# Patient Record
Sex: MALE | Race: BLACK OR AFRICAN AMERICAN | Employment: UNEMPLOYED | URBAN - METROPOLITAN AREA
[De-identification: names, ages, dates, MRNs, and addresses within clinical notes are randomized per-mention and may not be internally consistent; named-entity substitution may affect disease eponyms.]

---

## 2018-07-09 ENCOUNTER — HOSPITAL ENCOUNTER (EMERGENCY)
Age: 28
Discharge: HOME | End: 2018-07-09
Attending: EMERGENCY MEDICINE
Payer: SELF-PAY

## 2018-07-09 VITALS
RESPIRATION RATE: 16 BRPM | HEIGHT: 66 IN | BODY MASS INDEX: 27.04 KG/M2 | WEIGHT: 168.25 LBS | TEMPERATURE: 97.7 F | DIASTOLIC BLOOD PRESSURE: 68 MMHG | SYSTOLIC BLOOD PRESSURE: 116 MMHG | HEART RATE: 69 BPM | OXYGEN SATURATION: 100 %

## 2018-07-09 DIAGNOSIS — N48.5 PENILE ULCER: Primary | ICD-10-CM

## 2018-07-09 LAB — RPR SER QL: NONREACTIVE

## 2018-07-09 PROCEDURE — 74011000250 HC RX REV CODE- 250: Performed by: EMERGENCY MEDICINE

## 2018-07-09 PROCEDURE — 74011250636 HC RX REV CODE- 250/636: Performed by: EMERGENCY MEDICINE

## 2018-07-09 PROCEDURE — 96372 THER/PROPH/DIAG INJ SC/IM: CPT

## 2018-07-09 PROCEDURE — 74011250637 HC RX REV CODE- 250/637: Performed by: EMERGENCY MEDICINE

## 2018-07-09 PROCEDURE — 87491 CHLMYD TRACH DNA AMP PROBE: CPT | Performed by: EMERGENCY MEDICINE

## 2018-07-09 PROCEDURE — 99283 EMERGENCY DEPT VISIT LOW MDM: CPT

## 2018-07-09 PROCEDURE — 86592 SYPHILIS TEST NON-TREP QUAL: CPT | Performed by: EMERGENCY MEDICINE

## 2018-07-09 RX ORDER — AZITHROMYCIN 500 MG/1
1000 TABLET, FILM COATED ORAL
Status: COMPLETED | OUTPATIENT
Start: 2018-07-09 | End: 2018-07-09

## 2018-07-09 RX ORDER — ACYCLOVIR 800 MG/1
800 TABLET ORAL
Qty: 35 TAB | Refills: 0 | Status: SHIPPED | OUTPATIENT
Start: 2018-07-09 | End: 2018-07-16

## 2018-07-09 RX ADMIN — PENICILLIN G BENZATHINE 2.4 MILLION UNITS: 1200000 INJECTION, SUSPENSION INTRAMUSCULAR at 07:17

## 2018-07-09 RX ADMIN — LIDOCAINE HYDROCHLORIDE 250 MG: 10 INJECTION, SOLUTION EPIDURAL; INFILTRATION; INTRACAUDAL; PERINEURAL at 07:17

## 2018-07-09 RX ADMIN — AZITHROMYCIN 1000 MG: 500 TABLET, FILM COATED ORAL at 07:17

## 2018-07-09 NOTE — ED NOTES
Pt medicated as per provider orders. Pt accepted DC data and med's. Pt left unit steady gait. Patient (s)  given copy of dc instructions and 1 script(s). Patient (s)  verbalized understanding of instructions and script (s). Patient given a current medication reconciliation form and verbalized understanding of their medications. Patient (s) verbalized understanding of the importance of discussing medications with  his or her physician or clinic they will be following up with. Patient alert and oriented and in no acute distress. Patient discharged home ambulatory with self.

## 2018-07-09 NOTE — ED PROVIDER NOTES
HPI Comments: 26yo previously healthy male presents with several weeks of painless penile ulcers on the shaft of his penis. Denies drainage, pain, fevers, dysuria, penile discharge, abdominal pain. No h/o same. Patient is a 33 y.o. male presenting with skin problem. The history is provided by the patient. Skin Problem This is a new problem. The current episode started more than 1 week ago. The problem has not changed since onset. There has been no fever. The rash is present on the genitalia. The patient is experiencing no pain. Pertinent negatives include no itching, no pain, no weeping and no hives. He has tried nothing for the symptoms. No past medical history on file. No past surgical history on file. No family history on file. Social History Social History  Marital status:  Spouse name: N/A  
 Number of children: N/A  
 Years of education: N/A Occupational History  Not on file. Social History Main Topics  Smoking status: Not on file  Smokeless tobacco: Not on file  Alcohol use Not on file  Drug use: Not on file  Sexual activity: Not on file Other Topics Concern  Not on file Social History Narrative ALLERGIES: Review of patient's allergies indicates no known allergies. Review of Systems Constitutional: Negative. Negative for fever. HENT: Negative. Negative for drooling, facial swelling and trouble swallowing. Eyes: Negative. Negative for discharge and redness. Respiratory: Negative. Negative for chest tightness, shortness of breath and wheezing. Cardiovascular: Negative. Negative for chest pain. Gastrointestinal: Negative. Negative for abdominal distention, abdominal pain, constipation, diarrhea, nausea and vomiting. Endocrine: Negative. Genitourinary: Positive for genital sores. Negative for difficulty urinating, discharge, dysuria, penile pain, penile swelling, scrotal swelling and urgency. Musculoskeletal: Negative. Negative for arthralgias and myalgias. Skin: Negative. Negative for color change, itching and rash. Allergic/Immunologic: Negative. Neurological: Negative. Negative for syncope, facial asymmetry and speech difficulty. Hematological: Negative. Psychiatric/Behavioral: Negative. Negative for agitation and confusion. All other systems reviewed and are negative. Vitals:  
 07/09/18 9837 BP: 116/68 Pulse: 69 Resp: 16 Temp: 97.7 °F (36.5 °C) SpO2: 100% Weight: 76.3 kg (168 lb 4 oz) Height: 5' 6\" (1.676 m) Physical Exam  
Constitutional: He is oriented to person, place, and time. He appears well-developed and well-nourished. HENT:  
Head: Normocephalic and atraumatic. Eyes: Conjunctivae and EOM are normal.  
Neck: Neck supple. Cardiovascular: Normal rate, regular rhythm and intact distal pulses. Pulmonary/Chest: No accessory muscle usage. No respiratory distress. Abdominal: Soft. Normal appearance. There is no tenderness. Genitourinary: No penile tenderness. Genitourinary Comments: Several single (not clustered) ulcers of varying sizes (from 2-3mm to 0.5cm) on right proximal shaft of penis. No vesicles. No drainage. No erythema. Lesions are not tender No penile discharge. No abdominal or scrotal tenderness. Musculoskeletal: Normal range of motion. Neurological: He is alert and oriented to person, place, and time. Skin: Skin is warm and dry. Psychiatric: He has a normal mood and affect. His behavior is normal. Thought content normal.  
Nursing note and vitals reviewed. MDM Number of Diagnoses or Management Options Penile ulcer:  
Diagnosis management comments: Hsv, syphillis, lgv/chlamydia, chancroid, folliculitis, molluscum. I am sending rpr, chlamydia, gonorrhea tests and treating with penicillin, ceftriaxone, gonorrhea. Also treating with acyclovir.   
 
  
Amount and/or Complexity of Data Reviewed Clinical lab tests: ordered Discuss the patient with other providers: (ED colleague) ED Course Procedures

## 2018-07-09 NOTE — DISCHARGE INSTRUCTIONS
You were tested and treated for chlamydia, gonorrhea and syphillis. You are also getting antivirals for genital herpes. Exposure to Sexually Transmitted Infections: Care Instructions  Your Care Instructions    Sexually transmitted infections (STIs) are those diseases spread by sexual contact. There are at least 20 different STIs, including chlamydia, gonorrhea, syphilis, and human immunodeficiency virus (HIV), which causes AIDS. Bacteria-caused STIs can be treated and cured. STIs caused by viruses, such as HIV, can be treated but not cured. Some STIs can reduce a woman's chances of getting pregnant in the future. STIs are spread during sexual contact, such as vaginal intercourse and oral or anal sex. Follow-up care is a key part of your treatment and safety. Be sure to make and go to all appointments, and call your doctor if you are having problems. It's also a good idea to know your test results and keep a list of the medicines you take. How can you care for yourself at home? · Your doctor may have given you a shot of antibiotics. If your doctor prescribed antibiotic pills, take them as directed. Do not stop taking them just because you feel better. You need to take the full course of antibiotics. · Do not have sexual contact while you have symptoms of an STI or are being treated for an STI. · Tell your sex partner (or partners) that he or she will need treatment. · If you are a woman, do not douche. Douching changes the normal balance of bacteria in the vagina and may spread an infection up into your reproductive organs. To prevent exposure to STIs in the future  · Use latex condoms every time you have sex. Use them from the beginning to the end of sexual contact. · Talk to your partner before you have sex. Find out if he or she has or is at risk for any STI. Keep in mind that a person may be able to spread an STI even if he or she does not have symptoms.   · Do not have sex if you are being treated for an STI. · Do not have sex with anyone who has symptoms of an STI, such as sores on the genitals or mouth. · Having one sex partner (who does not have STIs and does not have sex with anyone else) is a good way to avoid STIs. When should you call for help? Call your doctor now or seek immediate medical care if:  ? · You have new pain in your belly or pelvis. ? · You have symptoms of a urinary tract infection. These may include:  ¨ Pain or burning when you urinate. ¨ A frequent need to urinate without being able to pass much urine. ¨ Pain in the flank, which is just below the rib cage and above the waist on either side of the back. ¨ Blood in your urine. ¨ A fever. ? · You have new or worsening pain or swelling in the scrotum. ? Watch closely for changes in your health, and be sure to contact your doctor if:  ? · You have unusual vaginal bleeding. ? · You have a discharge from the vagina or penis. ? · You have any new symptoms, such as sores, bumps, rashes, blisters, or warts. ? · You have itching, tingling, pain, or burning in the genital or anal area. ? · You think you may have an STI. Where can you learn more? Go to http://hamlet-anibal.info/. Enter Y943 in the search box to learn more about \"Exposure to Sexually Transmitted Infections: Care Instructions. \"  Current as of: March 20, 2017  Content Version: 11.4  © 8985-7617 [employer]. Care instructions adapted under license by [employer] (which disclaims liability or warranty for this information). If you have questions about a medical condition or this instruction, always ask your healthcare professional. [place] [address] any warranty or liability for your use of this information. Syphilis: Care Instructions  Your Care Instructions  Syphilis is an infection caused by bacteria. It's usually spread through sex.  It is one of several types of sexually transmitted infections (STIs). The first symptom is usually a painless, red sore on the genitals, rectal area, or mouth. This type of sore is called a chancre (say \"SHANK-er\"). Later, you may get other symptoms. These include a rash, a fever, and swollen lymph nodes. Your hair may start to fall out. Or you may feel like you have the flu. Sometimes these symptoms go away on their own. But this doesn't mean that the infection is gone. If you don't treat syphilis with antibiotics, the infection can spread in your body. You can also spread it to others. Antibiotics can cure syphilis and prevent more serious complications. Both you and your sex partner or partners need antibiotic treatment. This is to prevent you from passing the infection back and forth or to other sex partners. During the first 24 hours of treatment, you may have a fever, a headache, and muscle aches. After treatment, you will get blood tests to make sure you don't have any more bacteria in your body. You may also want to be tested for other STIs. Follow-up care is a key part of your treatment and safety. Be sure to make and go to all appointments, and call your doctor if you are having problems. It's also a good idea to know your test results and keep a list of the medicines you take. How can you care for yourself at home? · Your doctor probably gave you a shot of antibiotics. If you've had syphilis for a while, you may need 2 more shots. It's very important to get all the recommended shots. · If your doctor prescribed antibiotic pills, take them as directed. Do not stop taking them just because you feel better. You need to take the full course of antibiotics. · Do not have sexual contact with anyone while you are being treated. After treatment, wait at least 7 days and until all of your sores are healed before you have any sexual contact. Even if you use a condom, you and your partner may pass the infection back and forth.   · Wash your hands if you touch an infected area. This will help prevent spreading the infection to other parts of your body or to other people. · Tell your sex partner or partners that you have syphilis. They should get treatment even if they don't have symptoms. To prevent syphilis in the future  · Use latex condoms every time you have sex. Use them from the start to the end of sexual contact. · Talk to your partner before you have sex. Find out if he or she has or is at risk for syphilis or any other STI. Remember that a person without symptoms may still be able to spread an STI. · Do not have sex or any type of sexual contact if you are being treated for syphilis or any other STI. · Do not have sex with anyone who has symptoms of an STI. These include sores on the genitals or mouth. · Having one sex partner (who does not have STIs and does not have sex with anyone else) is a good way to avoid STIs. When should you call for help? Watch closely for changes in your health, and be sure to contact your doctor if:  ? · You do not get better as expected. ? · Your symptoms continue or come back after treatment. ? · You develop new symptoms, such as a fever. Where can you learn more? Go to http://hamlet-anibal.info/. Enter Z000 in the search box to learn more about \"Syphilis: Care Instructions. \"  Current as of: March 20, 2017  Content Version: 11.4  © 4172-2452 [employer]. Care instructions adapted under license by [employer] (which disclaims liability or warranty for this information). If you have questions about a medical condition or this instruction, always ask your healthcare professional. [place] [address] any warranty or liability for your use of this information. Genital Herpes: Care Instructions  Your Care Instructions  Genital herpes is caused by a virus called herpes simplex. There are two types of this virus.  Type 2 is the type that usually causes genital herpes. But type 1 can also cause it. Type 1 is the type that causes cold sores. Genital herpes is a sexually transmitted infection (STI). The most common way to get it is through sexual or other contact with someone who has herpes. For example, the virus can spread from a sore in the genital area to the lips. And it can spread from a cold sore on the lips to the genital area. Some people are surprised to find out that they have herpes or that they gave it to someone else. This is because a lot of people who have it don't know that they have it. They may not get sores or they may have sores that they cannot see. But the virus can still be spread by a person who does not have obvious sores or symptoms. There is no cure for herpes, but antiviral medicine can help you feel better and help prevent more outbreaks. This medicine may also lower the chance of spreading the virus. Follow-up care is a key part of your treatment and safety. Be sure to make and go to all appointments, and call your doctor if you are having problems. It's also a good idea to know your test results and keep a list of the medicines you take. How can you care for yourself at home? · Be safe with medicines. If your doctor prescribes medicine, take it exactly as prescribed. Call your doctor if you think you are having a problem with your medicine. You will get more details on the specific medicines your doctor prescribes. · To reduce the pain and itching from herpes sores:  ¨ Wash the area with water 3 or 4 times a day. ¨ Keep the sores clean and dry in between baths or showers. You may want to let the sores air dry. This may feel better than a towel. ¨ Wear cotton underwear. Cotton absorbs moisture well. ¨ Take an over-the-counter pain medicine, such as acetaminophen (Tylenol), ibuprofen (Advil, Motrin), or naproxen (Aleve). Read and follow all instructions on the label.  Do not take two or more pain medicines at the same time unless the doctor told you to. Many pain medicines have acetaminophen, which is Tylenol. Too much acetaminophen (Tylenol) can be harmful. To prevent the spread of genital herpes  · Latex condoms are a good way to reduce the risk of spreading the virus. But you can still get the virus even if you use a condom. For the best protection, use condoms every time you have sex, from the beginning to the end of sexual contact. Remember that you can infect someone even if you do not have obvious symptoms or sores. · Avoid sexual contact when you have symptoms. Symptoms include sores, tingling, or pain. · Wash your hands if you touch a sore. In some cases, especially if this is your first herpes outbreak, you can spread the virus to other parts of your body or to other people. · Having one sex partner (who does not have STIs and does not have sex with anyone else) is a good way to avoid STIs. · Talk to your sex partner or partners about genital herpes. · Encourage your sex partners to get a blood test to see if they have been infected. When should you call for help? Call your doctor now or seek immediate medical care if:  ? · You have a new fever. ? · There is increasing redness or red streaks around herpes sores. ? Watch closely for changes in your health, and be sure to contact your doctor if:  ? · You have herpes and you think you might be pregnant. ? · You have an outbreak of herpes sores, and the sores are not healing. ? · You have frequent outbreaks of genital herpes sores. ? · You are unable to pass urine or are constipated. ? · You want to start antiviral medicine. ? · You do not get better as expected. Where can you learn more? Go to http://hamlet-anibal.info/. Enter B400 in the search box to learn more about \"Genital Herpes: Care Instructions. \"  Current as of: March 20, 2017  Content Version: 11.4  © 2168-0395 [employer].  Care instructions adapted under license by [employer] (which disclaims liability or warranty for this information). If you have questions about a medical condition or this instruction, always ask your healthcare professional. [address] any warranty or liability for your use of this information.

## 2018-07-09 NOTE — ED NOTES
Patient presented to the ED today for complaints of skin problem. Patient says he has sores on his penis. Patient denies having urinary pain or discharge. Respirations are even and unlabored .

## 2018-07-11 LAB
C TRACH DNA SPEC QL NAA+PROBE: NEGATIVE
N GONORRHOEA DNA SPEC QL NAA+PROBE: NEGATIVE
SAMPLE TYPE: NORMAL
SERVICE CMNT-IMP: NORMAL
SPECIMEN SOURCE: NORMAL

## 2018-08-24 ENCOUNTER — HOSPITAL ENCOUNTER (EMERGENCY)
Age: 28
Discharge: HOME | End: 2018-08-24
Attending: EMERGENCY MEDICINE
Payer: SELF-PAY

## 2018-08-24 VITALS
SYSTOLIC BLOOD PRESSURE: 142 MMHG | RESPIRATION RATE: 16 BRPM | HEART RATE: 70 BPM | OXYGEN SATURATION: 98 % | DIASTOLIC BLOOD PRESSURE: 86 MMHG | WEIGHT: 186.95 LBS | HEIGHT: 66 IN | BODY MASS INDEX: 30.05 KG/M2 | TEMPERATURE: 98.3 F

## 2018-08-24 DIAGNOSIS — B07.8 OTHER VIRAL WARTS: Primary | ICD-10-CM

## 2018-08-24 PROCEDURE — 99282 EMERGENCY DEPT VISIT SF MDM: CPT

## 2018-08-24 NOTE — ED NOTES
Care of patient assumed from triage. Patient presenting with complaints of a wart to the right thumb that has been occurring for a few weeks. Patient states that he was seen at [facility] for it and was told to return if he experienced worsening symptoms. Patient is alert and oriented x4 with unlabored breathing. Patient oriented to room.

## 2018-08-24 NOTE — ED PROVIDER NOTES
EMERGENCY DEPARTMENT HISTORY AND PHYSICAL EXAM      Date: 8/24/2018  Patient Name: Tiffany Mancini    History of Presenting Illness     Chief Complaint   Patient presents with    Skin Problem     Pt has wart to right thumb x  a few weeks       History Provided By: Patient    HPI: Tiffany Mancini, 33 y.o. male presents to the ED with cc of a wart to his right hand. Notes that he wart has been present for a month. He denies pain but notes that it does itch at times. He is applied Compound W and wart removing pads without relief. There are no other complaints, changes, or physical findings at this time. PCP: None        Past History     Past Medical History:  No past medical history on file. Past Surgical History:  No past surgical history on file. Family History:  No family history on file. Social History:  Social History   Substance Use Topics    Smoking status: Not on file    Smokeless tobacco: Not on file    Alcohol use Not on file       Allergies:  No Known Allergies      Review of Systems   Review of Systems   Constitutional: Negative for chills, diaphoresis and fever. HENT: Negative for congestion, ear pain, rhinorrhea and sore throat. Respiratory: Negative for cough and shortness of breath. Cardiovascular: Negative for chest pain. Gastrointestinal: Negative for abdominal pain, constipation, diarrhea, nausea and vomiting. Genitourinary: Negative for difficulty urinating, dysuria, frequency and hematuria. Musculoskeletal: Negative for arthralgias and myalgias. Neurological: Negative for headaches. All other systems reviewed and are negative. Physical Exam   Physical Exam   Constitutional: He is oriented to person, place, and time. He appears well-developed and well-nourished. No distress. 33 y.o. -American male    HENT:   Head: Normocephalic and atraumatic. Eyes: Conjunctivae are normal. Right eye exhibits no discharge. Left eye exhibits no discharge.    Neck: Normal range of motion. Neck supple. Cardiovascular: Normal rate, regular rhythm, normal heart sounds and intact distal pulses. No murmur heard. Pulmonary/Chest: Effort normal and breath sounds normal. No respiratory distress. Neurological: He is alert and oriented to person, place, and time. Skin: Skin is warm and dry. He is not diaphoretic.   R HAND: Wart like lesion to the right hand just proximal to the thumb without surrounding erythema. Psychiatric: He has a normal mood and affect. His behavior is normal.   Nursing note and vitals reviewed. Diagnostic Study Results     Labs - None    Radiologic Studies - None    Medical Decision Making   I am the first provider for this patient. I reviewed the vital signs, available nursing notes, past medical history, past surgical history, family history and social history. Vital Signs-Reviewed the patient's vital signs. Patient Vitals for the past 12 hrs:   Temp Pulse Resp BP SpO2   08/24/18 1654 98.3 °F (36.8 °C) 70 16 142/86 98 %     Records Reviewed: Nursing Notes    Provider Notes (Medical Decision Making): [facility]    ED Course:   Initial assessment performed. The patients presenting problems have been discussed, and they are in agreement with the care plan formulated and outlined with them. I have encouraged them to ask questions as they arise throughout their visit. Critical Care Time:   none    Disposition:  DISCHARGE NOTE:  5:05 PM  The pt is ready for discharge. The pt's signs, symptoms, diagnosis, and discharge instructions have been discussed and pt has conveyed their understanding. The pt is to follow up as recommended or return to ER should their symptoms worsen. Plan has been discussed and pt is in agreement. PLAN:  1. There are no discharge medications for this patient.     2.   Follow-up Information     Follow up With Details Comments Daryn Soto MD   [address] [employer] [address] Bree Soto MD   [address]  [phone]      [facility], [address]  [phone]          Return to ED if worse     Diagnosis     Clinical Impression:   1.  Other viral warts

## 2018-08-24 NOTE — ED NOTES
YUE Ruggiero has reviewed discharge instructions with the patient. The patient verbalized understanding. Pt. A&Ox4, respirations even and unlabored. VS stable as noted in flowsheet. Declined wheelchair assist from department; paperwork in hand.

## 2018-08-24 NOTE — DISCHARGE INSTRUCTIONS
Warts: Care Instructions  Your Care Instructions  A wart is a harmless skin growth caused by a virus. The virus makes the top layer of skin grow quickly, causing a wart. Warts usually go away on their own in months or years. There are several types of warts. Common warts appear most often on the hands, but they may be anywhere on the body. Plantar warts occur on the soles of the feet and may cause pain when you walk. Warts spread easily. You can reinfect yourself by touching the wart and then touching another part of your body. You can infect others by sharing towels, razors, or other personal items. Most warts do not need treatment and go away on their own. But if warts cause pain or spread, your doctor may recommend that you use an over-the-counter treatment. These include salicylic acid or duct tape. Or your doctor may prescribe a stronger medicine to put on warts or may inject them with medicine. The doctor also can remove warts through surgery or by freezing them. Follow-up care is a key part of your treatment and safety. Be sure to make and go to all appointments, and call your doctor if you are having problems. It's also a good idea to know your test results and keep a list of the medicines you take. How can you care for yourself at home? For common warts  · Use salicylic acid or duct tape as your doctor directs. You put the medicine or the tape on a wart for several days and then file down the dead skin on the wart. You use the salicylic acid treatment for 2 to 3 months or the tape for 1 to 2 months. · If your doctor prescribes medicine to put on warts, use it exactly as directed. Call your doctor if you think you are having a problem with your medicine. For plantar (foot) warts  · Wear comfortable shoes and socks. Avoid high heels and shoes that put a lot of pressure on your foot. · Pad the wart with doughnut-shaped felt or a moleskin patch. You can buy these at a [facility].  Put the pad around the plantar wart so that it relieves pressure on the wart. You also can place pads or cushions in your shoes to make walking more comfortable. · Take an over-the-counter pain medicine, such as acetaminophen (Tylenol), ibuprofen (Advil, Motrin), or naproxen (Aleve). Read and follow all instructions on the label. · Do not take two or more pain medicines at the same time unless the doctor told you to. Many pain medicines have acetaminophen, which is Tylenol. Too much acetaminophen (Tylenol) can be harmful. To avoid spreading warts  · Keep warts covered with a bandage or athletic tape. · Do not bite your nails or cuticles. This may spread warts from one finger to another. When should you call for help? Call your doctor now or seek immediate medical care if:    · You have signs of infection, such as:  ¨ Increased pain, swelling, warmth, or redness. ¨ Red streaks leading from a wart. ¨ Pus draining from a wart. ¨ A fever.    Watch closely for changes in your health, and be sure to contact your doctor if:    · You do not get better as expected. Where can you learn more? Go to http://hamlet-anibal.info/. Enter R729 in the search box to learn more about \"Warts: Care Instructions. \"  Current as of: October 5, 2017  Content Version: 11.7  © 8534-5104 [employer]. Care instructions adapted under license by [employer] (which disclaims liability or warranty for this information). If you have questions about a medical condition or this instruction, always ask your healthcare professional. [place] [address] any warranty or liability for your use of this information.

## 2022-08-21 ENCOUNTER — HOSPITAL ENCOUNTER (EMERGENCY)
Age: 32
Discharge: HOME | End: 2022-08-21
Attending: EMERGENCY MEDICINE

## 2022-08-21 VITALS
RESPIRATION RATE: 17 BRPM | SYSTOLIC BLOOD PRESSURE: 100 MMHG | OXYGEN SATURATION: 99 % | WEIGHT: 172 LBS | HEIGHT: 67 IN | TEMPERATURE: 98.2 F | HEART RATE: 90 BPM | DIASTOLIC BLOOD PRESSURE: 62 MMHG | BODY MASS INDEX: 27 KG/M2

## 2022-08-21 DIAGNOSIS — J20.8 ACUTE BRONCHITIS DUE TO COVID-19 VIRUS: Primary | ICD-10-CM

## 2022-08-21 DIAGNOSIS — G44.209 ACUTE NON INTRACTABLE TENSION-TYPE HEADACHE: ICD-10-CM

## 2022-08-21 DIAGNOSIS — U07.1 ACUTE BRONCHITIS DUE TO COVID-19 VIRUS: Primary | ICD-10-CM

## 2022-08-21 DIAGNOSIS — R52 GENERALIZED BODY ACHES: ICD-10-CM

## 2022-08-21 LAB
FLUAV RNA SPEC QL NAA+PROBE: NOT DETECTED
FLUBV RNA SPEC QL NAA+PROBE: NOT DETECTED
SARS-COV-2, COV2: DETECTED

## 2022-08-21 PROCEDURE — 87636 SARSCOV2 & INF A&B AMP PRB: CPT

## 2022-08-21 PROCEDURE — 99283 EMERGENCY DEPT VISIT LOW MDM: CPT

## 2022-08-21 PROCEDURE — 74011250637 HC RX REV CODE- 250/637: Performed by: EMERGENCY MEDICINE

## 2022-08-21 RX ORDER — GUAIFENESIN 600 MG/1
600 TABLET, EXTENDED RELEASE ORAL ONCE
Status: COMPLETED | OUTPATIENT
Start: 2022-08-21 | End: 2022-08-21

## 2022-08-21 RX ORDER — BUTALBITAL, ACETAMINOPHEN AND CAFFEINE 50; 325; 40 MG/1; MG/1; MG/1
1 TABLET ORAL
Status: COMPLETED | OUTPATIENT
Start: 2022-08-21 | End: 2022-08-21

## 2022-08-21 RX ORDER — GUAIFENESIN 600 MG/1
600 TABLET, EXTENDED RELEASE ORAL 2 TIMES DAILY
Qty: 20 TABLET | Refills: 0 | Status: SHIPPED | OUTPATIENT
Start: 2022-08-21

## 2022-08-21 RX ORDER — UREA 10 %
220 LOTION (ML) TOPICAL 2 TIMES DAILY
Qty: 45 TABLET | Refills: 0 | Status: SHIPPED | OUTPATIENT
Start: 2022-08-21 | End: 2022-08-26

## 2022-08-21 RX ORDER — ASCORBIC ACID 500 MG
500 TABLET ORAL 2 TIMES DAILY
Qty: 10 TABLET | Refills: 0 | Status: SHIPPED | OUTPATIENT
Start: 2022-08-21 | End: 2022-08-26

## 2022-08-21 RX ORDER — BUTALBITAL, ACETAMINOPHEN AND CAFFEINE 300; 40; 50 MG/1; MG/1; MG/1
1 CAPSULE ORAL
Qty: 20 CAPSULE | Refills: 0 | Status: SHIPPED | OUTPATIENT
Start: 2022-08-21

## 2022-08-21 RX ADMIN — BUTALBITAL, ACETAMINOPHEN, AND CAFFEINE 1 TABLET: 50; 325; 40 TABLET ORAL at 22:19

## 2022-08-21 RX ADMIN — GUAIFENESIN 600 MG: 600 TABLET ORAL at 22:19

## 2022-08-21 NOTE — Clinical Note
[address] EMERGENCY DEPT  [address] [facility] [address] [phone]    Work/School Note    Date: 8/21/2022     To Whom It May concern:    Deshawn Ibanez was evaluated by the following provider(s):  Attending Provider: Lachelle Lopez MD.   Maudine [place] virus is suspected. Per the CDC guidelines we recommend home isolation until the following conditions are all met:    1. At least five days have passed since symptoms first appeared and/or had a close exposure,   2. After home isolation for five days, wearing a mask around others for the next five days,  3. At least 24 have passed since last fever without the use of fever-reducing medications and  4.  Symptoms (eg cough, shortness of breath) have improved      Sincerely,          Sandrine Schultz MD

## 2022-08-22 NOTE — ED PROVIDER NOTES
EMERGENCY DEPARTMENT HISTORY AND PHYSICAL EXAM        Please note that this dictation was completed with the assistance of \"Dragon\", the computer voice recognition software. Quite often unanticipated grammatical, syntax, homophones, and other interpretive errors are inadvertently transcribed by the computer software. Please disregard these errors and any errors that have escaped final proofreading. Thank you. Date: 08/21/22  Patient: Lacho Crawford  Patient Age and Sex: 33 y.o. male   MRN: [id number]  CSN: [id number]    History of Presenting Illness     Chief Complaint   Patient presents with    Concern For COVID-19 (Coronavirus)     History Provided By: Patient/family/EMS (if applicable)    HPI: Lacho Crawford, 33 y.o. male with past medical history as documented below presents to the ED with c/o of 2 days of mild to moderate bitemporal headache, dry cough as well as diarrhea. Patient also notes generalized body aches. Patient took a home COVID test that was positive. He has taken no medications here for symptoms. Patient states that he also needs a work note. Denies any chest pain or shortness of breath. Denies any underlying pulmonary disease. Pt denies any other exacerbating or ameliorating factors. Additionally, pt specifically denies any recent fever, chills, nausea, vomiting, abdominal pain, CP, SOB, lightheadedness, dizziness, numbness, weakness, lower extremity swelling, heart palpitations, urinary sxs, constipation, melena, hematochezia. There are no other complaints, changes or physical findings pertinent to the HPI at this time. PCP: None  Past History   Past Medical History:  Denies    Past Surgical History:  Denies    Family History:   Family history reviewed and was non-contributory, unless specified below:  Denies    Social History: Allergies:  No Known Allergies    Current Medications:  No current facility-administered medications on file prior to encounter.      No current outpatient medications on file prior to encounter. Review of Systems   A complete ROS was reviewed by me today and all other systems negative, unless otherwise specified below:  Review of Systems   Constitutional: Negative. Negative for chills and fever. HENT:  Positive for congestion. Negative for sore throat. Eyes: Negative. Respiratory:  Positive for cough. Negative for chest tightness, shortness of breath and wheezing. Cardiovascular: Negative. Negative for chest pain, palpitations and leg swelling. Gastrointestinal:  Positive for diarrhea. Negative for abdominal distention, abdominal pain, blood in stool, constipation, nausea and vomiting. Endocrine: Negative. Genitourinary: Negative. Negative for difficulty urinating, dysuria, flank pain, frequency, hematuria and urgency. Musculoskeletal: Negative. Negative for back pain and neck stiffness. Skin: Negative. Negative for rash. Allergic/Immunologic: Negative. Neurological:  Positive for headaches. Negative for dizziness, syncope, weakness, light-headedness and numbness. Hematological: Negative. Psychiatric/Behavioral: Negative. Negative for confusion and self-injury. Physical Exam   Physical Exam  Vitals and nursing note reviewed. Constitutional:       Appearance: He is well-developed. He is not toxic-appearing. HENT:      Head: Normocephalic and atraumatic. Nose: Congestion present. Mouth/Throat:      Pharynx: No posterior oropharyngeal erythema. Eyes:      Conjunctiva/sclera: Conjunctivae normal.      Pupils: Pupils are equal, round, and reactive to light. Cardiovascular:      Rate and Rhythm: Normal rate and regular rhythm. Heart sounds: Normal heart sounds. No murmur heard. No friction rub. No gallop. Pulmonary:      Effort: Pulmonary effort is normal. No respiratory distress. Breath sounds: Normal breath sounds. No wheezing or rales. Chest:      Chest wall: No tenderness. Abdominal:      General: Bowel sounds are normal. There is no distension. Palpations: Abdomen is soft. There is no mass. Tenderness: There is no abdominal tenderness. There is no guarding or rebound. Musculoskeletal:         General: No tenderness or deformity. Normal range of motion. Cervical back: Normal range of motion. Skin:     General: Skin is warm. Findings: No rash. Neurological:      Mental Status: He is alert and oriented to person, place, and time. Cranial Nerves: No cranial nerve deficit. Motor: No abnormal muscle tone. Coordination: Coordination normal.      Deep Tendon Reflexes: Reflexes normal.   Psychiatric:         Behavior: Behavior is cooperative. Diagnostic Study Results     Laboratory Data  I have personally reviewed and interpreted all available laboratory results. No results found for this or any previous visit (from the past 24 hour(s)). Radiologic Studies   I have personally reviewed and interpreted all available imaging studies and agree with radiology interpretation. No orders to display     CT Results  (Last 48 hours)      None          CXR Results  (Last 48 hours)      None          Medical Decision Making   I am the first and primary ED physician for this patient's ED visit today. I reviewed our electronic medical record system for any past medical records that may contribute to the patient's current condition, including their past medical history, surgical history, social and family history. This also includes their most recent ED visits, previous hospitalizations and prior diagnostic data. I have reviewed and summarized the most pertinent findings in my HPI and MDM.     Vital Signs Reviewed:  Patient Vitals for the past 24 hrs:   Temp Pulse Resp BP SpO2   08/21/22 2202 98.2 °F (36.8 °C) 90 17 100/62 99 %     Pulse Oximetry Analysis: 99% on RA    Cardiac Monitor:   Rate: 90 bpm  The cardiac monitor revealed the following rhythm as interpreted by me: Normal Sinus Rhythm  Cardiac monitoring was ordered to monitor patient for signs of cardiac dysrhythmia, which they are at risk for based on their history and/or risk for cardiovascular disease and/or metabolic abnormalities. Records Reviewed: Nursing Notes, Old Medical Records, Previous electrocardiograms, Previous Radiology Studies and Previous Laboratory Studies, EMS reports    Provider Notes (Medical Decision Making):   Pt presents with acute URI symptoms including nasal congestion, rhinorrhea. Pt also has c/o of cough without dyspnea, chest pain or wheezing. Pt is well-appearing with stable vitals and benign exam; symptoms are consistent with an uncomplicated URI. DDx: COVID-19, acute bronchitis, bacterial sinusitis vs. pharyngitis, migraine, flu. Symptomatic therapy suggested: acetaminophen, ibuprofen, antihistamine-decongestant of choice, cough suppressant of choice. Increase fluids, use vaporizer, stay in steamy bathroom tid 15 min prn severe cough, tylenol as needed, rest, avoid smoky areas. Lack of antibiotic effectiveness discussed with her. Symptomatic therapy suggested: gargle for sore throat, use mist at bedside for congestion. Apply facial warm packs for sinus pain or use nasal saline sprays. Follow up prn if not better in 72 hours. Pt presents with acute headache; afebrile with stable vitals; exam is without focal deficits. DDx: migraine, tension headache, cluster HA, stress, hypertensive urgency, dehydration. HA was gradual onset, pt neuro intact, no nausea/vomiting/focal weakness/sensory change to suggest CVA or ICH. Also pt is not immunocompromised, no fever, no focal weakness to suggest brain abscess. Therefore, no CT head. No neck stiffness, fever, AMS, photophobia to suggest meningitis. Neg kernig and Brudzinski. Therefore no LP. No jaw claudication, visual changes or temporal pain to suggest temporal arteritis, therefore no ESR/CRP.  No eye pain, PERRL, no red eye to suggest glaucoma. No risk factors for CO. Will treat pain and reassess. ED Course:   Initial assessment performed. I discussed presenting problems and concerns, and my formulated plan for today's visit with the patient and any available family members. I have encouraged them to ask questions as they arise throughout the visit. ED Orders Placed:  Orders Placed This Encounter    COVID-19 WITH INFLUENZA A/B    butalbital-acetaminophen-caffeine (FIORICET, ESGIC) -40 mg per tablet 1 Tablet    guaiFENesin ER (MUCINEX) tablet 600 mg    butalbital-acetaminophen-caff (Fioricet) -40 mg per capsule    guaiFENesin ER (Mucinex) 600 mg ER tablet    zinc sulfate 50 mg zinc (220 mg) tablet    ascorbic acid, vitamin C, (VITAMIN C) 500 mg tablet       ED Medications Administered During ED Course:  Medications   butalbital-acetaminophen-caffeine (FIORICET, ESGIC) -40 mg per tablet 1 Tablet (1 Tablet Oral Given 8/21/22 2219)   guaiFENesin ER (MUCINEX) tablet 600 mg (600 mg Oral Given 8/21/22 2219)        Progress Note:  Given concerns for COVID-19 infection in this patient, I spent extra time to ensure proper and full PPE was used for the initial assessment and for subsequent patient encounters for updates and reassessments. This was done to help combat the transmission of the virus to myself and other patients and staff in the emergency department. Progress Note:  I have just re-evaluated the patient. Patient reports improvement of sx's. I have reviewed His vital signs and determined there is currently no worsening in their condition or physical exam. Results have been reviewed with them and their questions have been answered. We will continue to review further results as they come available. Progress note:  Pt notes feeling better after ED treatment. Pt ambulated with pulse ox with saturations maintain > 92% and tolerated well.  Discussed lab and imaging findings with pt, specifically noting possible COVID-19 infection. Patient instructed on proper hygiene and self-quarantine, as well as lying prone for 3 hours a day. Pt instructed to self-isolate at home until 3 days after symptoms have resolved AND 7 days after symptoms first started, whichever is later. Provided with [facility] handout for likely COVID infection recommendations. Pt will follow up with health department or this emergency department immediately should symptoms worsen at any time as instructed. All questions have been answered, pt voiced understanding and agreement with plan. Progress Note:  Pt reassessed and symptoms noted to have improved significantly after ED treatment. Pt is clinically stable for discharge. Candy Hsu's labs and imaging have been reviewed with him and available family. He verbally conveys understanding and agreement of the signs, symptoms, diagnosis, treatment and prognosis and additionally agrees to follow up as recommended with Dr. None and/or specialist as instructed. He agrees with the care plan we have created and conveys that all of his questions have been answered. Additionally, I have put together a packet of discharge instructions for him that include: 1) educational information regarding their diagnosis, 2) how to care for their diagnosis at home, as well a 3) list of reasons why they would want to return to the ED prior to their follow-up appointment should the patient's condition change or symptoms worsen. I have answered all questions to the patient's satisfaction. Strict return precautions given. He conveyed understanding and agreement with care plan. Vital signs stable for discharge. Disposition:  DISCHARGE  The pt is ready for discharge. The pt's signs, symptoms, diagnosis, and discharge instructions have been discussed and pt has conveyed their understanding. The pt is to follow up as recommended or return to ER should their symptoms worsen.  Plan has been discussed and pt is in agreement. Plan:  1. Return precautions as discussed with patient and available family/caregiver. 2.   Current Discharge Medication List        START taking these medications    Details   butalbital-acetaminophen-caff (Fioricet) -40 mg per capsule Take 1 Capsule by mouth every four (4) hours as needed for Migraine or Headache. Indications: tension headache  Qty: 20 Capsule, Refills: 0  Start date: 8/21/2022      guaiFENesin ER (Mucinex) 600 mg ER tablet Take 1 Tablet by mouth two (2) times a day. Qty: 20 Tablet, Refills: 0  Start date: 8/21/2022      zinc sulfate 50 mg zinc (220 mg) tablet Take 4.5 Tablets by mouth two (2) times a day for 5 days. Qty: 45 Tablet, Refills: 0  Start date: 8/21/2022, End date: 8/26/2022      ascorbic acid, vitamin C, (VITAMIN C) 500 mg tablet Take 1 Tablet by mouth two (2) times a day for 5 days. Qty: 10 Tablet, Refills: 0  Start date: 8/21/2022, End date: 8/26/2022           3. Follow-up Information       Follow up With Specialties Details Why [address] [facility] EMERGENCY DEPT Emergency Medicine  As needed, If symptoms worsen [address] [facility] EMERGENCY DEPT Emergency Medicine  As needed, If symptoms worsen [address]          Instructed to return to ED if worse  Diagnosis   Clinical Impression:  1. Acute bronchitis due to COVID-19 virus    2. Acute non intractable tension-type headache    3. Generalized body aches      Attestation:  Joan Monetjo MD, am the attending of record for this patient. I personally performed the services described in this documentation on this date, 8/21/2022 for patient, Lacho Crawford. I have reviewed the chart and verified that the record is accurate and complete.

## 2022-08-22 NOTE — DISCHARGE INSTRUCTIONS
Thank You! It was a pleasure taking care of you in our Emergency Department today. We know that when you come to [address] [place] [address], you are entrusting us with your health, comfort, and safety. Our physicians and nurses honor that trust, and truly appreciate the opportunity to care for you and your loved ones. We also value your feedback. If you receive a survey about your Emergency Department experience today, please fill it out. We care about our patients' feedback, and we listen to what you have to say. Thank you. Dr. Merlin Dust, M.D.      ____________________________________________________________________  I have included a copy of your lab results and/or radiologic studies from today's visit so you can have them easily available at your follow-up visit. We hope you feel better and please do not hesitate to contact the ED if you have any questions at all! No results found for this or any previous visit (from the past 12 hour(s)). No orders to display     CT Results  (Last 48 hours)      None          The exam and treatment you received in the Emergency Department were for an urgent problem and are not intended as complete care. It is important that you follow up with a doctor, nurse practitioner, or physician assistant for ongoing care. If your symptoms become worse or you do not improve as expected and you are unable to reach your usual health care provider, you should return to the Emergency Department. We are available 24 hours a day. Please take your discharge instructions with you when you go to your follow-up appointment. If a prescription has been provided, please have it filled as soon as possible to prevent a delay in treatment. Read the entire medication instruction sheet provided to you by the pharmacy.  If you have any questions or reservations about taking the medication due to side effects or interactions with other medications, please call your primary care physician or contact the ER to speak with the charge nurse. Please make an appointment with your family doctor or the physician you were referred to for follow-up of this visit as instructed on your discharge paperwork. Return to the ER if you are unable to be seen or if you are unable to be seen in a timely manner. If you have any problem arranging the follow-up visit, contact the Emergency Department immediately.

## 2022-08-22 NOTE — ED NOTES
Emergency Department Nursing Plan of Care       The Nursing Plan of Care is developed from the Nursing assessment and Emergency Department Attending provider initial evaluation. The plan of care may be reviewed in the ED Provider note.     The Plan of Care was developed with the following considerations:   Patient / Family readiness to learn indicated by:verbalized understanding  Persons(s) to be included in education: patient  Barriers to Learning/Limitations:No    Signed     Christine Brock RN    8/21/2022   10:22 PM

## 2023-05-23 RX ORDER — GUAIFENESIN 600 MG/1
600 TABLET, EXTENDED RELEASE ORAL 2 TIMES DAILY
COMMUNITY
Start: 2022-08-21

## 2023-05-23 RX ORDER — BUTALBITAL, ACETAMINOPHEN AND CAFFEINE 300; 40; 50 MG/1; MG/1; MG/1
1 CAPSULE ORAL EVERY 4 HOURS PRN
COMMUNITY
Start: 2022-08-21

## 2024-03-06 ENCOUNTER — APPOINTMENT (OUTPATIENT)
Facility: HOSPITAL | Age: 34
End: 2024-03-06
Attending: EMERGENCY MEDICINE

## 2024-03-06 ENCOUNTER — HOSPITAL ENCOUNTER (EMERGENCY)
Facility: HOSPITAL | Age: 34
Discharge: HOME | End: 2024-03-06
Attending: EMERGENCY MEDICINE

## 2024-03-06 VITALS
DIASTOLIC BLOOD PRESSURE: 62 MMHG | HEIGHT: 66 IN | BODY MASS INDEX: 24.27 KG/M2 | HEART RATE: 65 BPM | TEMPERATURE: 98.2 F | OXYGEN SATURATION: 100 % | SYSTOLIC BLOOD PRESSURE: 108 MMHG | RESPIRATION RATE: 16 BRPM | WEIGHT: 151 LBS

## 2024-03-06 DIAGNOSIS — R60.0 LEG EDEMA: Primary | ICD-10-CM

## 2024-03-06 DIAGNOSIS — M79.605 PAIN OF LEFT LOWER EXTREMITY: ICD-10-CM

## 2024-03-06 LAB
ANION GAP SERPL CALC-SCNC: 7 MMOL/L (ref 5–15)
BASOPHILS # BLD: 0.1 K/UL (ref 0–0.1)
BASOPHILS NFR BLD: 1 % (ref 0–1)
BUN SERPL-MCNC: 8 MG/DL (ref 6–20)
BUN/CREAT SERPL: 8 (ref 12–20)
CALCIUM SERPL-MCNC: 8.7 MG/DL (ref 8.5–10.1)
CHLORIDE SERPL-SCNC: 103 MMOL/L (ref 97–108)
CO2 SERPL-SCNC: 29 MMOL/L (ref 21–32)
CREAT SERPL-MCNC: 1.04 MG/DL (ref 0.7–1.3)
D DIMER PPP FEU-MCNC: 0.59 MG/L FEU (ref 0–0.65)
DIFFERENTIAL METHOD BLD: ABNORMAL
ECHO BSA: 1.79 M2
EOSINOPHIL # BLD: 1 K/UL (ref 0–0.4)
EOSINOPHIL NFR BLD: 12 % (ref 0–7)
ERYTHROCYTE [DISTWIDTH] IN BLOOD BY AUTOMATED COUNT: 12.1 % (ref 11.5–14.5)
GLUCOSE SERPL-MCNC: 99 MG/DL (ref 65–100)
HCT VFR BLD AUTO: 39 % (ref 36.6–50.3)
HGB BLD-MCNC: 13.1 G/DL (ref 12.1–17)
IMM GRANULOCYTES # BLD AUTO: 0 K/UL (ref 0–0.04)
IMM GRANULOCYTES NFR BLD AUTO: 0 % (ref 0–0.5)
LYMPHOCYTES # BLD: 3.1 K/UL (ref 0.8–3.5)
LYMPHOCYTES NFR BLD: 37 % (ref 12–49)
MCH RBC QN AUTO: 30.5 PG (ref 26–34)
MCHC RBC AUTO-ENTMCNC: 33.6 G/DL (ref 30–36.5)
MCV RBC AUTO: 90.9 FL (ref 80–99)
MONOCYTES # BLD: 0.6 K/UL (ref 0–1)
MONOCYTES NFR BLD: 7 % (ref 5–13)
NEUTS SEG # BLD: 3.6 K/UL (ref 1.8–8)
NEUTS SEG NFR BLD: 43 % (ref 32–75)
NRBC # BLD: 0 K/UL (ref 0–0.01)
NRBC BLD-RTO: 0 PER 100 WBC
PLATELET # BLD AUTO: 291 K/UL (ref 150–400)
PMV BLD AUTO: 9 FL (ref 8.9–12.9)
POTASSIUM SERPL-SCNC: 3.4 MMOL/L (ref 3.5–5.1)
RBC # BLD AUTO: 4.29 M/UL (ref 4.1–5.7)
RBC MORPH BLD: ABNORMAL
SODIUM SERPL-SCNC: 139 MMOL/L (ref 136–145)
WBC # BLD AUTO: 8.4 K/UL (ref 4.1–11.1)

## 2024-03-06 PROCEDURE — 80048 BASIC METABOLIC PNL TOTAL CA: CPT

## 2024-03-06 PROCEDURE — 99284 EMERGENCY DEPT VISIT MOD MDM: CPT

## 2024-03-06 PROCEDURE — 93971 EXTREMITY STUDY: CPT

## 2024-03-06 PROCEDURE — 85379 FIBRIN DEGRADATION QUANT: CPT

## 2024-03-06 PROCEDURE — 85025 COMPLETE CBC W/AUTO DIFF WBC: CPT

## 2024-03-06 RX ORDER — IBUPROFEN 800 MG/1
800 TABLET ORAL EVERY 8 HOURS PRN
Qty: 24 TABLET | Refills: 0 | Status: SHIPPED | OUTPATIENT
Start: 2024-03-06

## 2024-03-06 ASSESSMENT — PAIN - FUNCTIONAL ASSESSMENT: PAIN_FUNCTIONAL_ASSESSMENT: 0-10

## 2024-03-06 ASSESSMENT — PAIN DESCRIPTION - PAIN TYPE: TYPE: ACUTE PAIN

## 2024-03-06 ASSESSMENT — PAIN DESCRIPTION - DESCRIPTORS: DESCRIPTORS: ACHING

## 2024-03-06 ASSESSMENT — PAIN DESCRIPTION - LOCATION: LOCATION: KNEE;LEG

## 2024-03-06 ASSESSMENT — PAIN SCALES - GENERAL: PAINLEVEL_OUTOF10: 6

## 2024-03-06 ASSESSMENT — PAIN DESCRIPTION - ORIENTATION: ORIENTATION: LEFT;POSTERIOR

## 2024-03-06 NOTE — ED PROVIDER NOTES
[facility] EMERGENCY DEPT  EMERGENCY DEPARTMENT ENCOUNTER       Pt Name: Nima Glaser  MRN: [id number]  Birthdate 1990  Date of evaluation: 3/6/2024  Provider: Mauricio Howe DO   PCP: No primary care provider on file.  Note Started: 3:23 AM EST 3/6/24     CHIEF COMPLAINT       Chief Complaint   Patient presents with    Leg Swelling        HISTORY OF PRESENT ILLNESS: 1 or more elements      History From: Patient, History limited by: none     Nima Glaser is a 33 y.o. male presenting the emergency department complaining of left leg pain and swelling.  Started yesterday, no known trauma.  No prior history of DVT or PE.  No overlying skin change       Please See MDM for Additional Details of the HPI/PMH  Nursing Notes were all reviewed and agreed with or any disagreements were addressed in the HPI.     REVIEW OF SYSTEMS        Positives and Pertinent negatives as per HPI.    PAST HISTORY     Past Medical History:  History reviewed. No pertinent past medical history.    Past Surgical History:  History reviewed. No pertinent surgical history.    Family History:  History reviewed. No pertinent family history.    Social History:  Social History     Tobacco Use    Smoking status: Unknown   Substance Use Topics    Alcohol use: Yes     Comment: occasionally    Drug use: Not Currently       Allergies:  No Known Allergies    CURRENT MEDICATIONS      Discharge Medication List as of 3/6/2024  6:52 AM        CONTINUE these medications which have NOT CHANGED    Details   butalbital-APAP-caffeine -40 MG CAPS per capsule Take 1 capsule by mouth every 4 hours as neededHistorical Med      guaiFENesin (MUCINEX) 600 MG extended release tablet Take 1 tablet by mouth 2 times dailyHistorical Med             SCREENINGS               No data recorded         PHYSICAL EXAM      ED Triage Vitals [03/06/24 0223]   Enc Vitals Group      /64      Pulse 92      Respirations 20      Temp 98.2 °F (36.8 °C)      Temp Source Oral      SpO2

## 2024-03-06 NOTE — ED NOTES
Discharge instructions were given to the patient by Mary Beth Roque RN.    The patient left the Emergency Department ambulatory, alert and oriented and in no acute distress with 1 prescription. The patient was encouraged to call or return to the ED for worsening issues or problems and was encouraged to schedule a follow up appointment for continuing care.    The patient verbalized understanding of discharge instructions and prescriptions, all questions were answered. The patient has no further concerns at this time.

## 2024-03-06 NOTE — ED NOTES
Pt presents to ED with CC of new onset left leg swelling and pain.  Pt reports waking up 2 days ago with swelling and tightness to left leg from knee down accompanied by slight discomfort at left knee joint.  Pt reports the discomfort got worse with ambulation and daily activity.  Pt reported to ED after talking with mother.  Pt expressess concern for possible DVT or joint abnormality.      Pt denies any sig medical hx, denies taking any routine meds or OTC meds.    Pt Aox4, answering questions appropriately.  Pt reports pain to central posterior knee with palpation as well as pain to posterior left calf.  Pt noted to have trace non-pitting edema to lateral left lower leg, more notable towards ankle. Skin intact, color approp for ethnicity, positive pedal pulses bilat, pos post tibial and popliteal pulses to left let.      Dr. Howe due to assess pt at bedside.      Emergency Department Nursing Plan of Care       The Nursing Plan of Care is developed from the Nursing assessment and Emergency Department Attending provider initial evaluation.  The plan of care may be reviewed in the “ED Provider note”.    The Plan of Care was developed with the following considerations:   Patient / Family readiness to learn indicated by:Refer to Medical [facility]  Persons(s) to be included in education: Refer to Medical chart in [facility]  Barriers to Learning/Limitations:Normal    Signed     Damaso Crocker RN    3/6/2024   2:44 AM

## 2024-03-06 NOTE — ED TRIAGE NOTES
Pt comes in ambulatory with c/o posterior knee pain x 2 days with swelling from knee down 1+, non-pitting. Pt denies significant medical history.

## 2024-06-12 ENCOUNTER — HOSPITAL ENCOUNTER (EMERGENCY)
Facility: HOSPITAL | Age: 34
Discharge: HOME | End: 2024-06-12

## 2024-06-12 VITALS
OXYGEN SATURATION: 98 % | BODY MASS INDEX: 24.51 KG/M2 | SYSTOLIC BLOOD PRESSURE: 112 MMHG | DIASTOLIC BLOOD PRESSURE: 65 MMHG | WEIGHT: 152.5 LBS | HEIGHT: 66 IN | HEART RATE: 86 BPM | TEMPERATURE: 97.2 F | RESPIRATION RATE: 16 BRPM

## 2024-06-12 DIAGNOSIS — Z20.2 STD EXPOSURE: Primary | ICD-10-CM

## 2024-06-12 LAB
APPEARANCE UR: CLEAR
BACTERIA URNS QL MICRO: NEGATIVE /HPF
BILIRUB UR QL: NEGATIVE
COLOR UR: ABNORMAL
EPITH CASTS URNS QL MICRO: ABNORMAL /LPF
GLUCOSE UR STRIP.AUTO-MCNC: NEGATIVE MG/DL
HGB UR QL STRIP: NEGATIVE
KETONES UR QL STRIP.AUTO: ABNORMAL MG/DL
LEUKOCYTE ESTERASE UR QL STRIP.AUTO: ABNORMAL
NITRITE UR QL STRIP.AUTO: NEGATIVE
PH UR STRIP: 6 (ref 5–8)
PROT UR STRIP-MCNC: NEGATIVE MG/DL
RBC #/AREA URNS HPF: ABNORMAL /HPF (ref 0–5)
SP GR UR REFRACTOMETRY: 1.02
URINE CULTURE IF INDICATED: ABNORMAL
UROBILINOGEN UR QL STRIP.AUTO: 1 EU/DL (ref 0.2–1)
WBC URNS QL MICRO: ABNORMAL /HPF (ref 0–4)

## 2024-06-12 PROCEDURE — 6360000002 HC RX W HCPCS: Performed by: NURSE PRACTITIONER

## 2024-06-12 PROCEDURE — 6370000000 HC RX 637 (ALT 250 FOR IP): Performed by: NURSE PRACTITIONER

## 2024-06-12 PROCEDURE — 81001 URINALYSIS AUTO W/SCOPE: CPT

## 2024-06-12 PROCEDURE — 99284 EMERGENCY DEPT VISIT MOD MDM: CPT

## 2024-06-12 PROCEDURE — 96372 THER/PROPH/DIAG INJ SC/IM: CPT

## 2024-06-12 PROCEDURE — 87591 N.GONORRHOEAE DNA AMP PROB: CPT

## 2024-06-12 PROCEDURE — 87491 CHLMYD TRACH DNA AMP PROBE: CPT

## 2024-06-12 PROCEDURE — 2500000003 HC RX 250 WO HCPCS: Performed by: NURSE PRACTITIONER

## 2024-06-12 RX ORDER — AZITHROMYCIN 500 MG/1
1000 TABLET, FILM COATED ORAL
Status: COMPLETED | OUTPATIENT
Start: 2024-06-12 | End: 2024-06-12

## 2024-06-12 RX ORDER — METRONIDAZOLE 500 MG/1
2000 TABLET ORAL
Status: COMPLETED | OUTPATIENT
Start: 2024-06-12 | End: 2024-06-12

## 2024-06-12 RX ADMIN — LIDOCAINE HYDROCHLORIDE 500 MG: 10 INJECTION, SOLUTION EPIDURAL; INFILTRATION; INTRACAUDAL; PERINEURAL at 13:21

## 2024-06-12 RX ADMIN — AZITHROMYCIN 1000 MG: 500 TABLET, FILM COATED ORAL at 13:20

## 2024-06-12 RX ADMIN — METRONIDAZOLE 2000 MG: 500 TABLET ORAL at 13:20

## 2024-06-12 ASSESSMENT — PAIN - FUNCTIONAL ASSESSMENT: PAIN_FUNCTIONAL_ASSESSMENT: NONE - DENIES PAIN

## 2024-06-12 NOTE — DISCHARGE INSTRUCTIONS
It was a pleasure taking care of you at [facility] Emergency Department today.  We know that when you come to [facility], you are entrusting us with your health, comfort, and safety.  Our physicians and nurses honor that trust, and we truly appreciate the opportunity to care for you and your loved ones.      We also value our feedback.  If you receive a survey about your Emergency Department experience today, please fill it out.  We care about our patients' feedback, and we listen to what you have to say.  Thank you!

## 2024-06-12 NOTE — ED TRIAGE NOTES
Patient states a girl he was having sexual relations with informed him she has a sexually transmitted disease.  He does not remember which one but admits to unprotected sex.

## 2024-06-12 NOTE — ED PROVIDER NOTES
[facility] EMERGENCY DEPT  EMERGENCY DEPARTMENT ENCOUNTER       Pt Name: Nima Glaser  MRN: [id number]  Birthdate 1990  Date of evaluation: 6/12/2024  Provider: BENJIE Trejo NP   PCP: No primary care provider on file.  Note Started: 12:29 PM EDT 6/12/24     CHIEF COMPLAINT       Chief Complaint   Patient presents with    Exposure to STD     Patient states a girl he was having sexual relations with informed him she has a sexually transmitted disease.  He does not remember which one but admits to unprotected sex.         HISTORY OF PRESENT ILLNESS: 1 or more elements      History From: Patient  HPI Limitations: None     Nima Glaser is a 34 y.o. male who presents for STD concerns.  Patient is sexually active with a female whom states that she was tested for STDs and he should be tested as well.  He denies penile discharge, testicular pain, testicular swelling, dysuria.  He denies history of STDs.     Nursing Notes were all reviewed and agreed with or any disagreements were addressed in the HPI.     REVIEW OF SYSTEMS      Review of Systems     Positives and Pertinent negatives as per HPI.    PAST HISTORY     Past Medical History:  History reviewed. No pertinent past medical history.    Past Surgical History:  History reviewed. No pertinent surgical history.    Family History:  History reviewed. No pertinent family history.    Social History:  Social History     Tobacco Use    Smoking status: Every Day     Types: Cigarettes   Substance Use Topics    Alcohol use: Not Currently    Drug use: Yes     Types: Marijuana (Weed)       Allergies:  No Known Allergies    CURRENT MEDICATIONS      Previous Medications    BUTALBITAL-APAP-CAFFEINE -40 MG CAPS PER CAPSULE    Take 1 capsule by mouth every 4 hours as needed    GUAIFENESIN (MUCINEX) 600 MG EXTENDED RELEASE TABLET    Take 1 tablet by mouth 2 times daily    IBUPROFEN (ADVIL;MOTRIN) 800 MG TABLET    Take 1 tablet by mouth every 8 hours as needed for Pain

## 2024-06-13 LAB
C TRACH DNA SPEC QL NAA+PROBE: POSITIVE
N GONORRHOEA DNA SPEC QL NAA+PROBE: NEGATIVE
SAMPLE TYPE: ABNORMAL
SERVICE CMNT-IMP: ABNORMAL
SPECIMEN SOURCE: ABNORMAL